# Patient Record
Sex: FEMALE | Race: WHITE
[De-identification: names, ages, dates, MRNs, and addresses within clinical notes are randomized per-mention and may not be internally consistent; named-entity substitution may affect disease eponyms.]

---

## 2018-06-14 ENCOUNTER — HOSPITAL ENCOUNTER (EMERGENCY)
Dept: HOSPITAL 17 - NEPC | Age: 28
Discharge: HOME | End: 2018-06-14
Payer: MEDICAID

## 2018-06-14 VITALS
OXYGEN SATURATION: 99 % | TEMPERATURE: 97.6 F | RESPIRATION RATE: 15 BRPM | SYSTOLIC BLOOD PRESSURE: 140 MMHG | HEART RATE: 60 BPM | DIASTOLIC BLOOD PRESSURE: 68 MMHG

## 2018-06-14 DIAGNOSIS — R51: Primary | ICD-10-CM

## 2018-06-14 DIAGNOSIS — H53.149: ICD-10-CM

## 2018-06-14 DIAGNOSIS — R20.2: ICD-10-CM

## 2018-06-14 LAB
BASOPHILS # BLD AUTO: 0.1 TH/MM3 (ref 0–0.2)
BASOPHILS NFR BLD: 0.9 % (ref 0–2)
BUN SERPL-MCNC: 16 MG/DL (ref 7–18)
CALCIUM SERPL-MCNC: 8.4 MG/DL (ref 8.5–10.1)
CHLORIDE SERPL-SCNC: 108 MEQ/L (ref 98–107)
CREAT SERPL-MCNC: 0.58 MG/DL (ref 0.5–1)
EOSINOPHIL # BLD: 0.1 TH/MM3 (ref 0–0.4)
EOSINOPHIL NFR BLD: 1.6 % (ref 0–4)
ERYTHROCYTE [DISTWIDTH] IN BLOOD BY AUTOMATED COUNT: 14.6 % (ref 11.6–17.2)
GFR SERPLBLD BASED ON 1.73 SQ M-ARVRAT: 123 ML/MIN (ref 89–?)
GLUCOSE SERPL-MCNC: 86 MG/DL (ref 74–106)
HCO3 BLD-SCNC: 21.5 MEQ/L (ref 21–32)
HCT VFR BLD CALC: 39.2 % (ref 35–46)
HGB BLD-MCNC: 12.9 GM/DL (ref 11.6–15.3)
LYMPHOCYTES # BLD AUTO: 1.9 TH/MM3 (ref 1–4.8)
LYMPHOCYTES NFR BLD AUTO: 27.4 % (ref 9–44)
MCH RBC QN AUTO: 28.3 PG (ref 27–34)
MCHC RBC AUTO-ENTMCNC: 32.9 % (ref 32–36)
MCV RBC AUTO: 86 FL (ref 80–100)
MONOCYTE #: 0.5 TH/MM3 (ref 0–0.9)
MONOCYTES NFR BLD: 7.8 % (ref 0–8)
NEUTROPHILS # BLD AUTO: 4.3 TH/MM3 (ref 1.8–7.7)
NEUTROPHILS NFR BLD AUTO: 62.3 % (ref 16–70)
PLATELET # BLD: 233 TH/MM3 (ref 150–450)
PMV BLD AUTO: 8.8 FL (ref 7–11)
RBC # BLD AUTO: 4.56 MIL/MM3 (ref 4–5.3)
SODIUM SERPL-SCNC: 139 MEQ/L (ref 136–145)
WBC # BLD AUTO: 7 TH/MM3 (ref 4–11)

## 2018-06-14 PROCEDURE — 85025 COMPLETE CBC W/AUTO DIFF WBC: CPT

## 2018-06-14 PROCEDURE — 80048 BASIC METABOLIC PNL TOTAL CA: CPT

## 2018-06-14 PROCEDURE — 96374 THER/PROPH/DIAG INJ IV PUSH: CPT

## 2018-06-14 PROCEDURE — 99284 EMERGENCY DEPT VISIT MOD MDM: CPT

## 2018-06-14 PROCEDURE — 96375 TX/PRO/DX INJ NEW DRUG ADDON: CPT

## 2018-06-14 PROCEDURE — 96361 HYDRATE IV INFUSION ADD-ON: CPT

## 2018-06-14 PROCEDURE — 70450 CT HEAD/BRAIN W/O DYE: CPT

## 2018-06-14 PROCEDURE — 84703 CHORIONIC GONADOTROPIN ASSAY: CPT

## 2018-06-14 NOTE — RADRPT
EXAM DATE:  6/14/2018 3:35 AM EDT

AGE/SEX:        27 years / Female



INDICATIONS:  Cephalgia for four days.



CLINICAL DATA:  This is the patient's initial encounter. Patient reports that signs and symptoms have
 been present for 4 - 6 days and indicates a pain score of 5/10. 

                                                                          

MEDICAL/SURGICAL HISTORY:   None. None.



RADIATION DOSE:  56.35 CTDI (mGy)







COMPARISON:      No prior exams available for comparison. 







TECHNIQUE:  CT of the head without contrast.  Using automated exposure control and adjustment of the 
mA and/or kV according to patient size, radiation dose was kept as low as reasonably achievable to ob
tain optimal diagnostic quality images.



FINDINGS: 

Cerebrum:  The ventricles are normal for age.  No evidence of midline shift, mass lesion, hemorrhage 
or acute infarction.  No extraaxial fluid collections are seen.

Posterior Fossa:  The cerebellum and brainstem are intact.  The 4th ventricle is midline.  The cerebe
llopontine angle is unremarkable.

Extracranial:  The visualized portion of the orbits is intact.

Skull:  The calvaria is intact.  No evidence of skull fracture.



CONCLUSION:

No acute intracranial findings.



Electronically signed by: Paul Kramer MD  6/14/2018 3:46 AM EDT

## 2018-06-14 NOTE — PD
HPI


Chief Complaint:  Headache


Time Seen by Provider:  02:30


Travel History


International Travel<30 days:  No


Contact w/Intl Traveler<30days:  No


Traveled to known affect area:  No





History of Present Illness


HPI


27-year-old female presents to the emergency department complaint of headache 

since Saturday.  Patient states she has history of headaches and typically 

headaches resolve after drinking caffeine or taking Advil.  Patient's continued 

to have headache even though she is use Advil and caffeine.  Patient states 

headache seemed to dissipate for about 4 hours and then once the medication 

wears off it returns.  Patient has family history of headache but no history of 

migraine cluster headache or cerebral aneurysm.  Patient rates headache as 6-7/

10 in intensity and severe.  Patient states headache is associated with 

photophobia and phonophobia.  No upper or lower extremity weakness but has had 

some paresthesias no febrile illness or injury.  Patient is unable to identify 

exacerbating or alleviating factors.





PFSH


Past Medical History


*** Narrative Medical


Headaches, occasional alcohol; nursing notes reviewed


Diminished Hearing:  No


Headaches:  Yes (in the past)


Immunizations Current:  Yes


Pregnant?:  Not Pregnant


LMP:  IRREGULAR, MERINA IMPLANT


:  2


Para:  2





Past Surgical History


Surgical History:  No Previous Surgery





Social History


Alcohol Use:  Yes (OCC)


Tobacco Use:  No


Substance Use:  No





Allergies-Medications


(Allergen,Severity, Reaction):  


Coded Allergies:  


     No Known Allergies (Verified  Adverse Reaction, Unknown, 18)


Reported Meds & Prescriptions





Reported Meds & Active Scripts


Active


Zofran Odt (Ondansetron Odt) 4 Mg Tab 4 Mg SL Q6HR PRN


Reported


Advil Liqui-Gels (Ibuprofen) 200 Mg Capsule





Review of Systems


Except as stated in HPI:  all other systems reviewed are Neg


General / Constitutional:  No: Fever, Chills


HENT:  Positive: Headaches, No: Congestion, Neck Stiffness


Cardiovascular:  No: Chest Pain or Discomfort


Respiratory:  No: Shortness of Breath


Gastrointestinal:  Positive: Nausea, Vomiting, No: Abdominal Pain


Genitourinary:  No: Dysuria


Musculoskeletal:  No: Myalgias, Arthralgias


Skin:  No Rash


Neurologic:  Positive: Headache, No: Weakness, Change in Mentation


Psychiatric:  No: Anxiety


Hematologic/Lymphatic:  No: Lymph Node Enlargement





Physical Exam


Narrative


GENERAL: Well-developed well-nourished female in no acute distress no 

respiratory distress GCS 15


SKIN: Warm and dry.


HEAD: Atraumatic. Normocephalic. 


EYES: Pupils equal and round. No scleral icterus. No injection or drainage. 


ENT: No nasal bleeding or discharge.  Mucous membranes pink and moist.


NECK: Trachea midline. No JVD. 


CARDIOVASCULAR: Regular rate and rhythm.  


RESPIRATORY: No accessory muscle use. Clear to auscultation. Breath sounds 

equal bilaterally. 


GASTROINTESTINAL: Abdomen soft, non-tender, nondistended. Hepatic and splenic 

margins not palpable. 


MUSCULOSKELETAL: Extremities without clubbing, cyanosis, or edema. No obvious 

deformities. 


NEUROLOGICAL: Awake and alert. No obvious cranial nerve deficits.  Motor 

grossly within normal limits. Five out of 5 muscle strength in the arms and 

legs.  No limb ataxia no pronator drift normal speech.


PSYCHIATRIC: Appropriate mood and affect; insight and judgment normal.





Data


Data


Last Documented VS





Vital Signs








  Date Time  Temp Pulse Resp B/P (MAP) Pulse Ox O2 Delivery O2 Flow Rate FiO2


 


18 00:26 97.6 60 15 140/68 (92) 99   








Orders





 Orders


Complete Blood Count With Diff (18 02:30)


Basic Metabolic Panel (Bmp) (18 02:30)


Ct Brain W/O Iv Contrast(Rout) (18 02:30)


Ecg Monitoring (18 02:30)


Iv Access Insert/Monitor (18 02:30)


Oximetry (18 02:30)


Sodium Chloride 0.9% Flush (Ns Flush) (18 02:30)


Prochlorperazine Inj (Compazine Inj) (18 02:30)


Diphenhydramine Inj (Benadryl Inj) (18 02:30)


Sodium Chlor 0.9% 1000 Ml Inj (Ns 1000 M (18 02:30)


Ed Urine Pregnancytest Poc (18 02:30)


Ketorolac Inj (Toradol Inj) (18 04:00)


Ed Discharge Order (18 04:09)





Labs





Laboratory Tests








Test


  18


03:06


 


White Blood Count 7.0 TH/MM3 


 


Red Blood Count 4.56 MIL/MM3 


 


Hemoglobin 12.9 GM/DL 


 


Hematocrit 39.2 % 


 


Mean Corpuscular Volume 86.0 FL 


 


Mean Corpuscular Hemoglobin 28.3 PG 


 


Mean Corpuscular Hemoglobin


Concent 32.9 % 


 


 


Red Cell Distribution Width 14.6 % 


 


Platelet Count 233 TH/MM3 


 


Mean Platelet Volume 8.8 FL 


 


Neutrophils (%) (Auto) 62.3 % 


 


Lymphocytes (%) (Auto) 27.4 % 


 


Monocytes (%) (Auto) 7.8 % 


 


Eosinophils (%) (Auto) 1.6 % 


 


Basophils (%) (Auto) 0.9 % 


 


Neutrophils # (Auto) 4.3 TH/MM3 


 


Lymphocytes # (Auto) 1.9 TH/MM3 


 


Monocytes # (Auto) 0.5 TH/MM3 


 


Eosinophils # (Auto) 0.1 TH/MM3 


 


Basophils # (Auto) 0.1 TH/MM3 


 


CBC Comment DIFF FINAL 


 


Differential Comment  


 


Blood Urea Nitrogen 16 MG/DL 


 


Creatinine 0.58 MG/DL 


 


Random Glucose 86 MG/DL 


 


Calcium Level 8.4 MG/DL 


 


Sodium Level 139 MEQ/L 


 


Potassium Level 4.3 MEQ/L 


 


Chloride Level 108 MEQ/L 


 


Carbon Dioxide Level 21.5 MEQ/L 


 


Anion Gap 10 MEQ/L 


 


Estimat Glomerular Filtration


Rate 123 ML/MIN 


 











MDM


Medical Decision Making


Medical Screen Exam Complete:  Yes


Emergency Medical Condition:  Yes


Medical Record Reviewed:  Yes


Interpretation(s)


POC hCG: Negative





Last Impressions








Head CT 18 0230 Signed





Impressions: 





 CONCLUSION:





 No acute intracranial findings.





  





 





CBC & BMP Diagram


18 03:06








Calcium Level 8.4 L








Vital Signs








  Date Time  Temp Pulse Resp B/P (MAP) Pulse Ox O2 Delivery O2 Flow Rate FiO2


 


18 00:26 97.6 60 15 140/68 (92) 99   








Differential Diagnosis


Cephalgia tension versus migraine versus cluster versus vascular versus 

infectious versus musculoskeletal, dehydration, viral syndrome, ICH


Narrative Course


Patient placed on monitor IV access obtained patient administered Compazine 10 

mg IV Benadryl 25 mg IV and CT brain noncontrast ordered


Lab values grossly normal range patient has normal total white cell count no 

leukocytosis no left shift


CT brain reveals no acute abnormality


Headache is decreased to 2-3/10 administered Toradol 30 mg IV


At 4 AM headache is 0/10 intensity and patient stable for outpatient management





Diagnosis





 Primary Impression:  


 Cephalgia


Referrals:  


Primary Care Physician


1 day


Patient Instructions:  General Instructions





***Additional Instructions:  


Increase fluid hydration


Take Zofran as prescribed as needed for nausea and/or vomiting


Monitor temperature take acetaminophen/Tylenol as needed for fever 100.4F or 

greater


May take weight-based ibuprofen 600 mg as often as every 6 hours for pain 

associated with inflammation


Follow-up with your primary care provider call office in a.m.


Return to the emergency department for any concerns or change in condition


***Med/Other Pt SpecificInfo:  Prescription(s) given


Scripts


Ondansetron Odt (Zofran Odt) 4 Mg Tab


4 MG SL Q6HR Y for Nausea/Vomiting, #10 TAB 0 Refills


   Prov: Katiana Emerson MD         18


Disposition:  01 DISCHARGE HOME


Condition:  Stable











Katiana Emerson MD 2018 04:09